# Patient Record
Sex: FEMALE | Race: WHITE | NOT HISPANIC OR LATINO | URBAN - METROPOLITAN AREA
[De-identification: names, ages, dates, MRNs, and addresses within clinical notes are randomized per-mention and may not be internally consistent; named-entity substitution may affect disease eponyms.]

---

## 2017-08-25 ENCOUNTER — EMERGENCY (EMERGENCY)
Facility: HOSPITAL | Age: 72
LOS: 1 days | End: 2017-08-25
Payer: MEDICARE

## 2017-08-25 ENCOUNTER — OUTPATIENT (OUTPATIENT)
Dept: OUTPATIENT SERVICES | Facility: HOSPITAL | Age: 72
LOS: 1 days | End: 2017-08-25

## 2017-08-25 PROCEDURE — 71010: CPT | Mod: 26

## 2017-08-25 PROCEDURE — 99285 EMERGENCY DEPT VISIT HI MDM: CPT

## 2017-08-26 ENCOUNTER — OUTPATIENT (OUTPATIENT)
Dept: OUTPATIENT SERVICES | Facility: HOSPITAL | Age: 72
LOS: 1 days | End: 2017-08-26

## 2022-09-13 ENCOUNTER — APPOINTMENT (OUTPATIENT)
Dept: OPHTHALMOLOGY | Facility: CLINIC | Age: 77
End: 2022-09-13

## 2022-09-13 ENCOUNTER — NON-APPOINTMENT (OUTPATIENT)
Age: 77
End: 2022-09-13

## 2022-09-13 PROCEDURE — 92014 COMPRE OPH EXAM EST PT 1/>: CPT

## 2022-09-13 PROCEDURE — 92134 CPTRZ OPH DX IMG PST SGM RTA: CPT

## 2022-09-27 ENCOUNTER — NON-APPOINTMENT (OUTPATIENT)
Age: 77
End: 2022-09-27

## 2022-09-27 ENCOUNTER — APPOINTMENT (OUTPATIENT)
Dept: OPHTHALMOLOGY | Facility: CLINIC | Age: 77
End: 2022-09-27

## 2022-09-27 PROCEDURE — 92134 CPTRZ OPH DX IMG PST SGM RTA: CPT | Mod: LT

## 2022-09-27 PROCEDURE — 92014 COMPRE OPH EXAM EST PT 1/>: CPT

## 2022-10-07 ENCOUNTER — APPOINTMENT (OUTPATIENT)
Dept: OPHTHALMOLOGY | Facility: CLINIC | Age: 77
End: 2022-10-07

## 2022-10-07 ENCOUNTER — NON-APPOINTMENT (OUTPATIENT)
Age: 77
End: 2022-10-07

## 2022-10-07 PROCEDURE — 67028 INJECTION EYE DRUG: CPT | Mod: LT

## 2022-10-14 ENCOUNTER — NON-APPOINTMENT (OUTPATIENT)
Age: 77
End: 2022-10-14

## 2022-10-14 ENCOUNTER — APPOINTMENT (OUTPATIENT)
Dept: OPHTHALMOLOGY | Facility: CLINIC | Age: 77
End: 2022-10-14

## 2022-10-14 PROCEDURE — 92012 INTRM OPH EXAM EST PATIENT: CPT

## 2022-11-04 ENCOUNTER — NON-APPOINTMENT (OUTPATIENT)
Age: 77
End: 2022-11-04

## 2022-11-04 ENCOUNTER — APPOINTMENT (OUTPATIENT)
Dept: OPHTHALMOLOGY | Facility: CLINIC | Age: 77
End: 2022-11-04

## 2022-11-04 PROCEDURE — 92012 INTRM OPH EXAM EST PATIENT: CPT

## 2022-11-04 PROCEDURE — 92134 CPTRZ OPH DX IMG PST SGM RTA: CPT

## 2022-12-02 ENCOUNTER — APPOINTMENT (OUTPATIENT)
Dept: OPHTHALMOLOGY | Facility: CLINIC | Age: 77
End: 2022-12-02
Payer: MEDICARE

## 2022-12-02 ENCOUNTER — NON-APPOINTMENT (OUTPATIENT)
Age: 77
End: 2022-12-02

## 2022-12-02 PROCEDURE — 92134 CPTRZ OPH DX IMG PST SGM RTA: CPT | Mod: LT

## 2022-12-02 PROCEDURE — 67028 INJECTION EYE DRUG: CPT | Mod: LT

## 2023-01-06 ENCOUNTER — NON-APPOINTMENT (OUTPATIENT)
Age: 78
End: 2023-01-06

## 2023-01-06 ENCOUNTER — APPOINTMENT (OUTPATIENT)
Dept: OPHTHALMOLOGY | Facility: CLINIC | Age: 78
End: 2023-01-06
Payer: MEDICARE

## 2023-01-06 PROCEDURE — 92012 INTRM OPH EXAM EST PATIENT: CPT

## 2023-01-06 PROCEDURE — 92134 CPTRZ OPH DX IMG PST SGM RTA: CPT

## 2023-01-17 ENCOUNTER — APPOINTMENT (OUTPATIENT)
Dept: OPHTHALMOLOGY | Facility: CLINIC | Age: 78
End: 2023-01-17

## 2023-02-03 ENCOUNTER — NON-APPOINTMENT (OUTPATIENT)
Age: 78
End: 2023-02-03

## 2023-02-03 ENCOUNTER — APPOINTMENT (OUTPATIENT)
Dept: OPHTHALMOLOGY | Facility: CLINIC | Age: 78
End: 2023-02-03
Payer: MEDICARE

## 2023-02-03 PROCEDURE — 67028 INJECTION EYE DRUG: CPT | Mod: LT

## 2023-02-03 PROCEDURE — 92134 CPTRZ OPH DX IMG PST SGM RTA: CPT

## 2023-02-14 PROBLEM — Z00.00 ENCOUNTER FOR PREVENTIVE HEALTH EXAMINATION: Status: ACTIVE | Noted: 2023-02-14

## 2023-03-03 ENCOUNTER — NON-APPOINTMENT (OUTPATIENT)
Age: 78
End: 2023-03-03

## 2023-03-03 ENCOUNTER — APPOINTMENT (OUTPATIENT)
Dept: OPHTHALMOLOGY | Facility: CLINIC | Age: 78
End: 2023-03-03
Payer: MEDICARE

## 2023-03-03 PROCEDURE — 92134 CPTRZ OPH DX IMG PST SGM RTA: CPT

## 2023-03-03 PROCEDURE — 67028 INJECTION EYE DRUG: CPT | Mod: LT

## 2023-04-14 ENCOUNTER — NON-APPOINTMENT (OUTPATIENT)
Age: 78
End: 2023-04-14

## 2023-04-14 ENCOUNTER — APPOINTMENT (OUTPATIENT)
Dept: OPHTHALMOLOGY | Facility: CLINIC | Age: 78
End: 2023-04-14
Payer: MEDICARE

## 2023-04-14 PROCEDURE — 92012 INTRM OPH EXAM EST PATIENT: CPT

## 2023-04-14 PROCEDURE — 92134 CPTRZ OPH DX IMG PST SGM RTA: CPT

## 2023-05-26 ENCOUNTER — APPOINTMENT (OUTPATIENT)
Dept: OPHTHALMOLOGY | Facility: CLINIC | Age: 78
End: 2023-05-26
Payer: MEDICARE

## 2023-05-26 ENCOUNTER — NON-APPOINTMENT (OUTPATIENT)
Age: 78
End: 2023-05-26

## 2023-05-26 PROCEDURE — 92134 CPTRZ OPH DX IMG PST SGM RTA: CPT

## 2023-05-26 PROCEDURE — 67028 INJECTION EYE DRUG: CPT | Mod: LT

## 2023-06-20 ENCOUNTER — NON-APPOINTMENT (OUTPATIENT)
Age: 78
End: 2023-06-20

## 2023-06-20 ENCOUNTER — APPOINTMENT (OUTPATIENT)
Dept: OPHTHALMOLOGY | Facility: CLINIC | Age: 78
End: 2023-06-20
Payer: MEDICARE

## 2023-06-20 PROCEDURE — 92014 COMPRE OPH EXAM EST PT 1/>: CPT

## 2023-06-23 ENCOUNTER — NON-APPOINTMENT (OUTPATIENT)
Age: 78
End: 2023-06-23

## 2023-06-23 ENCOUNTER — APPOINTMENT (OUTPATIENT)
Dept: OPHTHALMOLOGY | Facility: CLINIC | Age: 78
End: 2023-06-23
Payer: MEDICARE

## 2023-06-23 PROCEDURE — 92012 INTRM OPH EXAM EST PATIENT: CPT

## 2023-06-23 PROCEDURE — 92134 CPTRZ OPH DX IMG PST SGM RTA: CPT | Mod: LT

## 2023-07-21 ENCOUNTER — NON-APPOINTMENT (OUTPATIENT)
Age: 78
End: 2023-07-21

## 2023-07-21 ENCOUNTER — APPOINTMENT (OUTPATIENT)
Dept: OPHTHALMOLOGY | Facility: CLINIC | Age: 78
End: 2023-07-21
Payer: MEDICARE

## 2023-07-21 PROCEDURE — 92134 CPTRZ OPH DX IMG PST SGM RTA: CPT

## 2023-07-21 PROCEDURE — 67028 INJECTION EYE DRUG: CPT | Mod: LT

## 2023-08-22 ENCOUNTER — APPOINTMENT (OUTPATIENT)
Dept: OPHTHALMOLOGY | Facility: CLINIC | Age: 78
End: 2023-08-22
Payer: MEDICARE

## 2023-08-22 ENCOUNTER — NON-APPOINTMENT (OUTPATIENT)
Age: 78
End: 2023-08-22

## 2023-08-22 PROCEDURE — 92012 INTRM OPH EXAM EST PATIENT: CPT

## 2023-08-22 PROCEDURE — 92134 CPTRZ OPH DX IMG PST SGM RTA: CPT

## 2023-09-26 ENCOUNTER — NON-APPOINTMENT (OUTPATIENT)
Age: 78
End: 2023-09-26

## 2023-09-26 ENCOUNTER — APPOINTMENT (OUTPATIENT)
Dept: OPHTHALMOLOGY | Facility: CLINIC | Age: 78
End: 2023-09-26
Payer: MEDICARE

## 2023-09-26 PROCEDURE — 92134 CPTRZ OPH DX IMG PST SGM RTA: CPT

## 2023-09-26 PROCEDURE — 67028 INJECTION EYE DRUG: CPT | Mod: LT

## 2023-11-07 ENCOUNTER — APPOINTMENT (OUTPATIENT)
Dept: OPHTHALMOLOGY | Facility: CLINIC | Age: 78
End: 2023-11-07
Payer: MEDICARE

## 2023-11-07 ENCOUNTER — NON-APPOINTMENT (OUTPATIENT)
Age: 78
End: 2023-11-07

## 2023-11-07 PROCEDURE — 92134 CPTRZ OPH DX IMG PST SGM RTA: CPT

## 2023-11-07 PROCEDURE — 92014 COMPRE OPH EXAM EST PT 1/>: CPT

## 2023-12-12 ENCOUNTER — APPOINTMENT (OUTPATIENT)
Dept: OPHTHALMOLOGY | Facility: CLINIC | Age: 78
End: 2023-12-12

## 2023-12-19 ENCOUNTER — NON-APPOINTMENT (OUTPATIENT)
Age: 78
End: 2023-12-19

## 2023-12-19 ENCOUNTER — APPOINTMENT (OUTPATIENT)
Dept: OPHTHALMOLOGY | Facility: CLINIC | Age: 78
End: 2023-12-19
Payer: MEDICARE

## 2023-12-19 PROCEDURE — 67028 INJECTION EYE DRUG: CPT | Mod: LT

## 2023-12-19 PROCEDURE — 92134 CPTRZ OPH DX IMG PST SGM RTA: CPT

## 2024-01-09 ENCOUNTER — APPOINTMENT (OUTPATIENT)
Dept: OPHTHALMOLOGY | Facility: CLINIC | Age: 79
End: 2024-01-09
Payer: MEDICARE

## 2024-01-09 ENCOUNTER — NON-APPOINTMENT (OUTPATIENT)
Age: 79
End: 2024-01-09

## 2024-01-09 PROCEDURE — 67210 TREATMENT OF RETINAL LESION: CPT | Mod: LT

## 2024-01-16 ENCOUNTER — APPOINTMENT (OUTPATIENT)
Dept: OPHTHALMOLOGY | Facility: CLINIC | Age: 79
End: 2024-01-16
Payer: MEDICARE

## 2024-01-16 ENCOUNTER — NON-APPOINTMENT (OUTPATIENT)
Age: 79
End: 2024-01-16

## 2024-01-16 PROCEDURE — 99024 POSTOP FOLLOW-UP VISIT: CPT

## 2024-01-16 PROCEDURE — 92134 CPTRZ OPH DX IMG PST SGM RTA: CPT

## 2024-01-30 ENCOUNTER — APPOINTMENT (OUTPATIENT)
Dept: OPHTHALMOLOGY | Facility: CLINIC | Age: 79
End: 2024-01-30
Payer: MEDICARE

## 2024-01-30 ENCOUNTER — NON-APPOINTMENT (OUTPATIENT)
Age: 79
End: 2024-01-30

## 2024-01-30 PROCEDURE — 92133 CPTRZD OPH DX IMG PST SGM ON: CPT

## 2024-01-30 PROCEDURE — 76514 ECHO EXAM OF EYE THICKNESS: CPT

## 2024-01-30 PROCEDURE — 92014 COMPRE OPH EXAM EST PT 1/>: CPT | Mod: 24

## 2024-01-30 PROCEDURE — 92083 EXTENDED VISUAL FIELD XM: CPT

## 2024-02-16 ENCOUNTER — APPOINTMENT (OUTPATIENT)
Dept: OPHTHALMOLOGY | Facility: CLINIC | Age: 79
End: 2024-02-16
Payer: MEDICARE

## 2024-02-16 ENCOUNTER — NON-APPOINTMENT (OUTPATIENT)
Age: 79
End: 2024-02-16

## 2024-02-16 PROCEDURE — 92134 CPTRZ OPH DX IMG PST SGM RTA: CPT

## 2024-02-16 PROCEDURE — 67028 INJECTION EYE DRUG: CPT | Mod: 79,LT

## 2024-03-25 ENCOUNTER — APPOINTMENT (OUTPATIENT)
Dept: OBGYN | Facility: CLINIC | Age: 79
End: 2024-03-25
Payer: MEDICARE

## 2024-03-25 VITALS
HEIGHT: 60 IN | SYSTOLIC BLOOD PRESSURE: 166 MMHG | WEIGHT: 190 LBS | DIASTOLIC BLOOD PRESSURE: 84 MMHG | BODY MASS INDEX: 37.3 KG/M2

## 2024-03-25 DIAGNOSIS — Z82.49 FAMILY HISTORY OF ISCHEMIC HEART DISEASE AND OTHER DISEASES OF THE CIRCULATORY SYSTEM: ICD-10-CM

## 2024-03-25 DIAGNOSIS — Z86.718 PERSONAL HISTORY OF OTHER VENOUS THROMBOSIS AND EMBOLISM: ICD-10-CM

## 2024-03-25 DIAGNOSIS — I10 ESSENTIAL (PRIMARY) HYPERTENSION: ICD-10-CM

## 2024-03-25 DIAGNOSIS — Z78.9 OTHER SPECIFIED HEALTH STATUS: ICD-10-CM

## 2024-03-25 DIAGNOSIS — E78.5 HYPERLIPIDEMIA, UNSPECIFIED: ICD-10-CM

## 2024-03-25 PROCEDURE — 99213 OFFICE O/P EST LOW 20 MIN: CPT

## 2024-03-25 RX ORDER — MELATONIN 3 MG
TABLET ORAL
Refills: 0 | Status: ACTIVE | COMMUNITY

## 2024-03-25 RX ORDER — CLONAZEPAM 2 MG/1
TABLET ORAL
Refills: 0 | Status: ACTIVE | COMMUNITY

## 2024-03-25 NOTE — HISTORY OF PRESENT ILLNESS
[FreeTextEntry1] : 79 yo  presents with history of "pressure" and "feeling like something is coming out" since January.  Denies incontinence or any difficulties with urination.  Hx of right inguinal hernia, Htn, hyperlipidemia

## 2024-03-25 NOTE — DISCUSSION/SUMMARY
[FreeTextEntry1] : I described cystocele to patient and reviewed etiology and management options.  I have referred her to Dr. Cooper for further evaluatio and management.  Patient verbalizes understanding of and agreement with this plan.  All questions answered to patient's satisfaction.

## 2024-03-25 NOTE — REVIEW OF SYSTEMS
[Patient Intake Form Reviewed] : Patient intake form was reviewed [FreeTextEntry8] : pressure in vagina

## 2024-03-25 NOTE — PHYSICAL EXAM
[Alert] : alert [Appropriately responsive] : appropriately responsive [Regular Rate Rhythm] : regular rate rhythm [Oriented x3] : oriented x3 [Clear to Auscultation B/L] : clear to auscultation bilaterally [Labia Majora] : normal [Labia Minora] : normal [Normal] : normal [Cystocele] : a cystocele [FreeTextEntry4] : Grade 2 cystocele

## 2024-03-29 ENCOUNTER — NON-APPOINTMENT (OUTPATIENT)
Age: 79
End: 2024-03-29

## 2024-03-29 ENCOUNTER — APPOINTMENT (OUTPATIENT)
Dept: OPHTHALMOLOGY | Facility: CLINIC | Age: 79
End: 2024-03-29
Payer: MEDICARE

## 2024-03-29 PROCEDURE — 92134 CPTRZ OPH DX IMG PST SGM RTA: CPT

## 2024-03-29 PROCEDURE — 99024 POSTOP FOLLOW-UP VISIT: CPT

## 2024-04-12 ENCOUNTER — APPOINTMENT (OUTPATIENT)
Dept: UROGYNECOLOGY | Facility: CLINIC | Age: 79
End: 2024-04-12
Payer: MEDICARE

## 2024-04-12 DIAGNOSIS — R35.0 FREQUENCY OF MICTURITION: ICD-10-CM

## 2024-04-12 DIAGNOSIS — R14.0 ABDOMINAL DISTENSION (GASEOUS): ICD-10-CM

## 2024-04-12 PROCEDURE — 99204 OFFICE O/P NEW MOD 45 MIN: CPT | Mod: 25

## 2024-04-12 PROCEDURE — 51701 INSERT BLADDER CATHETER: CPT | Mod: 59

## 2024-04-12 PROCEDURE — 81003 URINALYSIS AUTO W/O SCOPE: CPT | Mod: QW

## 2024-04-12 PROCEDURE — 99459 PELVIC EXAMINATION: CPT

## 2024-04-12 NOTE — PROCEDURE
[Straight Catheterization] : insertion of a straight catheter [Patient] : the patient [___ Fr Straight Tip] : a [unfilled] in [place] straight tip catheter [None] : there were no complications with the catheter insertion [Clear] : clear [No Complications] : no complications [Tolerated Well] : the patient tolerated the procedure well [Post procedure instructions and information given] : Post procedure instructions and information were given and reviewed with patient. [0] : 0 [Urinary Frequency] : urinary frequency [Culture] : culture [Urinalysis] : urinalysis

## 2024-04-12 NOTE — ASSESSMENT
[FreeTextEntry1] : Patient is a 78 year old multipara with new onset of pelvic organ prolapse mainly involving the anterior vaginal wall for past 2 to 3 months.  Patient is bothered by sensation of vaginal pressure after prolonged standing however she denies difficulty voiding or defecating.  On exam, she has a small cystocele (stage II) with a well supported cervix however exam was limited by poor Valsalva

## 2024-04-12 NOTE — LETTER BODY
[Dear  ___] : Dear ~GARCIA, [I had the pleasure of evaluating your patient, [unfilled]. Thank you for referring Ms. [unfilled] for consultation for ___] : I had the pleasure of evaluating your patient, [unfilled]. Thank you for referring Ms. [unfilled] for consultation for [unfilled]. [Attached please find my note.] : Attached please find my note. [Thank you very much for allowing me to participate in the care of this patient. If you have any questions, please do not hesitate to contact me] : Thank you very much for allowing me to participate in the care of this patient. If you have any questions, please do not hesitate to contact me.

## 2024-04-12 NOTE — DISCUSSION/SUMMARY
[FreeTextEntry1] : Patient and her niece were counseled regarding evaluation and management of pelvic organ prolapse. Following management plan was outlined after detailed discussion with patient. 1.  We discussed expectant management, pessary versus surgical management for anterior vaginal wall prolapse.  She is bothered by the prolapse hence she does not want to pursue expectant management.  She feels that she would not be able to tolerate pessary maintenance exams.  She is interested in definite surgical management.  Discussed option of isolated native tissue anterior colporrhaphy versus anterior colporrhaphy with sacrospinous hysteropexy versus vaginal hysterectomy, BSO, anterior colporrhaphy.  Recommended pelvic ultrasound to exclude adnexal mass or thickened endometrium.  Patient will review all the above options.  She will return after pelvic ultrasound for further discussion 2.  Recommended urodynamic testing to screen for occult stress urinary incontinence and for further evaluation of nocturia 3.  Recommended CT abdomen due to mild the presence of mild abdominal distention.  Patient never had colonoscopy. 4.  Urine culture was sent to exclude urinary tract infection.  If there is evidence of urinary tract infection, will recommend antibiotic such as Keflex 500 mg p.o. twice daily for 7 days 5. F/u after urodynamic testing

## 2024-04-12 NOTE — PHYSICAL EXAM
[Chaperone Present] : A chaperone was present in the examining room during all aspects of the physical examination [82348] : A chaperone was present during the pelvic exam. [FreeTextEntry2] : Lolly [FreeTextEntry1] : General: Not in acute distress, alert and oriented x3.   Neck: Supple. No lymphadenopathy.  Abdomen: Soft, nontender, appears to be mildly distended without any guarding. No obvious hepatosplenomegaly. No obvious hernias. A midline upper abdominal vertical skin incision of previous cholecystectomy with a scar suggestive of previous drain placement.  Pelvic Exam: Normal external female genitalia. Saddle sensory exam S2 to S4 is intact. Perineal reflexes not visualized. Urethra is hypermobile without prolapse, exudates, or lesions. Cough stress test is negative with and without anterior vaginal wall reduction. Post void residual was checked with I/O cath and was 50 cc of clear urine. Pale and atrophic-appearing vaginal epithelium. No vaginal blood or discharge. Cervix not visualized but palpably normal.  Anterior vaginal wall prolapse to 1 cm outside the hymen, however the exam was limited by her habitus.  Bimanual exam reveals a small uterus in normal positioning. No palpable adnexal masses or tenderness.    POPQ: Aa +1 , Ba +1, C -5, TVL 9, D-5, GH 3, PB 3, Ap -2.5, Bp -2.5 (poor Valsalva, patient unable to relax )

## 2024-04-12 NOTE — HISTORY OF PRESENT ILLNESS
Avelino Morillo is a 70 y.o. female.     History of Present Illness     The following portions of the patient's history were reviewed and updated as appropriate: allergies, current medications, past family history, past medical history, past social history, past surgical history and problem list.    1 HTN- chronic and controlled.  Patient denies any shortness of breath, chest pain, nausea, vomiting, headache, fatigue, any other systemic symptoms.    2 Cataracts- chronic.  Patient recently had cataract surgery and is improving with vision and doing well.    3 Left Wrist pain-acute.  Over the past 2 to 3 weeks patient has been having increase pain in the left wrist.  Associated with flexion, extension, and occasionally with typing.  Patient denies any recent trauma to the left wrist but it does interfere with her ability to do dexterity and typing.    4 right shoulder pain-right shoulder pain has been chronic and continues to have some mild discomfort with the shoulder with rotational movement and reach.        Review of Systems   All other systems reviewed and are negative.      Objective   Physical Exam  Vitals signs and nursing note reviewed.   Constitutional:       Appearance: Normal appearance. She is normal weight.   HENT:      Head: Normocephalic and atraumatic.      Mouth/Throat:      Mouth: Mucous membranes are moist.      Pharynx: Oropharynx is clear.   Eyes:      Extraocular Movements: Extraocular movements intact.      Pupils: Pupils are equal, round, and reactive to light.   Neck:      Musculoskeletal: Normal range of motion and neck supple.   Cardiovascular:      Rate and Rhythm: Normal rate and regular rhythm.      Pulses: Normal pulses.      Heart sounds: Normal heart sounds.   Pulmonary:      Effort: Pulmonary effort is normal.      Breath sounds: Normal breath sounds.   Abdominal:      General: Bowel sounds are normal.      Palpations: Abdomen is soft.   Musculoskeletal: Normal range  of motion.         General: Tenderness present.      Comments: Tenderness to palpation in the left wrist.  Tenderness and soreness to palpation in the right shoulder.   Neurological:      Mental Status: She is alert.           Assessment/Plan   Diagnoses and all orders for this visit:    1. Chronic pain of right knee (Primary)-patient will follow up with orthopedics and is most likely due for right knee replacement.    2. Cataract of both eyes, unspecified cataract type-chronic continue with ophthalmology recommendations.    3. Left wrist pain  -     meloxicam (Mobic) 15 MG tablet; Take 1 tablet by mouth Daily.  Dispense: 30 tablet; Refill: 3    4. Essential hypertension  -     losartan (COZAAR) 100 MG tablet; Take 1 tablet by mouth Daily.  Dispense: 90 tablet; Refill: 1                [FreeTextEntry1] : Patient is a 78 year para 3 ( x 3) who is referred by Leonela Thompson for evaluation and management of possible prolapse. Patient is from [place].  She speaks and understands English however she prefers her niece to accompany her and translates for her if needed   Pt reports she feels vaginal bulge and pressure in her vagina for last two months. Reports after prolong standing and walking she can touch something is coming out.  Per niece, patient reports discomfort in the vaginal area on prolonged standing.  Denies vaginal discharge and bleeding. Denies EDVIN and other urinary symptoms like frequency, urgency, hematuria, nephrolithiasis, vaginal dryness, diarrhea and constipation. Pt is not sexually active and doesn't smoke.    GYN: LMP ~, Last pap haven't had for years. PMH: HTN, Cardiac stent,  ? blood clots PSH: Inguinal hernia repair , cholecystectomy Meds: Simvastatin, Amlodipine, lisinopril, carvedilol, Albuterol, Aspirin 81mg, Allx: NKDA

## 2024-04-15 LAB
APPEARANCE: CLEAR
BACTERIA UR CULT: NORMAL
BACTERIA: NEGATIVE /HPF
BILIRUBIN URINE: NEGATIVE
BLOOD URINE: NEGATIVE
CAST: 0 /LPF
COLOR: YELLOW
EPITHELIAL CELLS: 0 /HPF
GLUCOSE QUALITATIVE U: NEGATIVE MG/DL
KETONES URINE: ABNORMAL MG/DL
LEUKOCYTE ESTERASE URINE: ABNORMAL
MICROSCOPIC-UA: NORMAL
NITRITE URINE: NEGATIVE
PH URINE: 5.5
PROTEIN URINE: NEGATIVE MG/DL
RED BLOOD CELLS URINE: 0 /HPF
SPECIFIC GRAVITY URINE: 1.01
UROBILINOGEN URINE: 0.2 MG/DL
WHITE BLOOD CELLS URINE: 0 /HPF

## 2024-04-24 ENCOUNTER — APPOINTMENT (OUTPATIENT)
Dept: ULTRASOUND IMAGING | Facility: CLINIC | Age: 79
End: 2024-04-24
Payer: MEDICARE

## 2024-04-24 ENCOUNTER — APPOINTMENT (OUTPATIENT)
Dept: CT IMAGING | Facility: CLINIC | Age: 79
End: 2024-04-24

## 2024-04-24 PROCEDURE — 76830 TRANSVAGINAL US NON-OB: CPT

## 2024-04-24 PROCEDURE — 74178 CT ABD&PLV WO CNTR FLWD CNTR: CPT

## 2024-04-29 ENCOUNTER — NON-APPOINTMENT (OUTPATIENT)
Age: 79
End: 2024-04-29

## 2024-05-03 ENCOUNTER — NON-APPOINTMENT (OUTPATIENT)
Age: 79
End: 2024-05-03

## 2024-05-03 ENCOUNTER — APPOINTMENT (OUTPATIENT)
Dept: OPHTHALMOLOGY | Facility: CLINIC | Age: 79
End: 2024-05-03
Payer: MEDICARE

## 2024-05-03 PROCEDURE — 99213 OFFICE O/P EST LOW 20 MIN: CPT

## 2024-05-03 PROCEDURE — 92134 CPTRZ OPH DX IMG PST SGM RTA: CPT

## 2024-05-16 ENCOUNTER — RESULT CHARGE (OUTPATIENT)
Age: 79
End: 2024-05-16

## 2024-05-17 ENCOUNTER — APPOINTMENT (OUTPATIENT)
Dept: UROGYNECOLOGY | Facility: CLINIC | Age: 79
End: 2024-05-17
Payer: MEDICARE

## 2024-05-17 DIAGNOSIS — N81.2 INCOMPLETE UTEROVAGINAL PROLAPSE: ICD-10-CM

## 2024-05-17 PROCEDURE — 51784 ANAL/URINARY MUSCLE STUDY: CPT

## 2024-05-17 PROCEDURE — 51797 INTRAABDOMINAL PRESSURE TEST: CPT

## 2024-05-17 PROCEDURE — 51741 ELECTRO-UROFLOWMETRY FIRST: CPT

## 2024-05-17 PROCEDURE — 51729 CYSTOMETROGRAM W/VP&UP: CPT

## 2024-05-20 LAB
APPEARANCE: CLEAR
BACTERIA UR CULT: NORMAL
BACTERIA: NEGATIVE /HPF
BILIRUBIN URINE: NEGATIVE
BLOOD URINE: NEGATIVE
CAST: 0 /LPF
COLOR: YELLOW
EPITHELIAL CELLS: 0 /HPF
GLUCOSE QUALITATIVE U: NEGATIVE MG/DL
KETONES URINE: NEGATIVE MG/DL
LEUKOCYTE ESTERASE URINE: NEGATIVE
MICROSCOPIC-UA: NORMAL
NITRITE URINE: NEGATIVE
PH URINE: 6
PROTEIN URINE: NEGATIVE MG/DL
RED BLOOD CELLS URINE: 0 /HPF
REVIEW: NORMAL
SPECIFIC GRAVITY URINE: 1.02
UROBILINOGEN URINE: 0.2 MG/DL
WHITE BLOOD CELLS URINE: 0 /HPF

## 2024-05-23 ENCOUNTER — APPOINTMENT (OUTPATIENT)
Dept: UROGYNECOLOGY | Facility: CLINIC | Age: 79
End: 2024-05-23
Payer: MEDICARE

## 2024-05-23 PROCEDURE — 99214 OFFICE O/P EST MOD 30 MIN: CPT

## 2024-05-23 NOTE — DISCUSSION/SUMMARY
[FreeTextEntry1] : Patient is Polish speaking.  Entire visit was conducted with the help of language line solutions Polish  Zully ID #  [id number]  Patient was counseled regarding the findings of urodynamic testing.  I did patient denies symptoms of urine incontinence.  She states that her underwear is always dry.  I counseled her regarding the etiology of occult stress urinary incontinence.  I discussed pros and cons of concomitant retropubic sling procedure versus a staged approach.  I counseled her regarding additional risk associated with sling placement including risk of bladder catheterization, voiding dysfunction, frequent urinary tract infections, urgency incontinence etc. after detailed discussion, she would like to proceed with the sling placement at the time of prolapse repair.  I again reviewed isolated native tissue anterior colporrhaphy versus sacrospinous hysteropexy with anterior repair versus robot-assisted supracervical hysterectomy and sacrocolpopexy.  After detailed discussion, patient would like to undergo a vaginal procedure.  On exam, she has a well supported cervix and I anticipate her requiring anterior repair only, however, I will examine her under anesthesia and if needed, will perform sacrospinous hysteropexy.  Patient is comfortable with this plan.  She was counseled regarding the risk of the procedure including injury to surrounding organs, urinary tract infections etc. postoperative recovery time discussed, and restrictions reviewed.  I explained to her that we may not be able to perform the surgery in June however I have submitted the OR booking sheet.  Advised her not to travel overseas for at least 4 weeks following surgery to avoid complications such as DVT etc.  She verbalized understand Patient's niece Stefanie who accompanied her on last visit he was not able to make it today.  Stefanie can be reached at [phone] to help patient coordinate the surgical booking etc.

## 2024-05-23 NOTE — ASSESSMENT
[FreeTextEntry1] : Patient's urodynamic test findings are consistent with increase sensation, normal cystometric capacity, normal compliance, absence of detrusor overactivity during filling, presence of a stress urinary incontinence and absence of voiding dysfunction however there appears to be hypoactivity of the bladder muscles. She appears to be a reasonable candidate for mid urethral sling placement however she is at high risk of requiring bladder catheterization in the postop period.  Pelvic exam was repeated again today and confirmed presence of isolated anterior vaginal wall prolapse

## 2024-05-23 NOTE — HISTORY OF PRESENT ILLNESS
[FreeTextEntry1] : Patient is a 78 year old multipara with new onset of pelvic organ prolapse mainly involving the anterior vaginal wall for past 3-4 months. Patient is bothered by sensation of vaginal pressure after prolonged standing however she denies difficulty voiding or defecating. On exam, she has a small cystocele (stage II) with a well supported cervix however exam was limited by poor Valsalva.. Patient is considering surgical management.. She presented for urodynamic testing on 5/17/2024. Her urodynamic test findings include an inconclusive uroflow due to small voided volume of 10 cc with postvoid residual of 40 cc; her complex cystogram showed increased sensation, normal compliance, reduced cystometric capacity of 200 cc however she voided 366 cc for the pressure voiding study indicating cystometric capacity of at least 366 cc,, presence of a stress urinary incontinence with cough starting at filled volume of 100 cc and with Valsalva starting at fill volume of 200 cc, and absence of detrusor overactivity during filling pelvic: She voided 366 cc for pressure voiding study in the presence of pressure transducers with max flow rate of 10, flow time 7., Detrusor pressure at peak flow of 4 cm of water, somewhat prolonged uroflow and detrusor contraction as the mechanism of void.  I reviewed patient's CAT scan from 5/9/2024. She has small renall cysts but no hydronephrosis or renal mass. Colonic diverticulosis without diverticulitis   I also reviewed her transvaginal pelvic ultrasound that showed endometrial thickness of 5 mm, small uterine fibroids, absence of endometrial fluid and absence of adnexal mass.  Patient is planning to travel to [place] in end of June and would like to schedule her surgery as soon as possible

## 2024-05-23 NOTE — PHYSICAL EXAM
[No Acute Distress] : in no acute distress [Well developed] : well developed [Oriented x3] : oriented to person, place, and time [FreeTextEntry1] : General: Not in acute distress, alert and oriented x3. Pelvic Exam: Normal external female genitalia.  Anterior vaginal wall again noted to be protruding at least a centimeter outside the hymen.  Cervix is well supported, at least 5 to 6 cm above the hymen

## 2024-05-24 LAB
APPEARANCE: CLEAR
BACTERIA: NEGATIVE /HPF
BILIRUBIN URINE: NEGATIVE
BLOOD URINE: NEGATIVE
CAST: 0 /LPF
COLOR: YELLOW
EPITHELIAL CELLS: 0 /HPF
GLUCOSE QUALITATIVE U: NEGATIVE MG/DL
KETONES URINE: NEGATIVE MG/DL
LEUKOCYTE ESTERASE URINE: ABNORMAL
MICROSCOPIC-UA: NORMAL
NITRITE URINE: NEGATIVE
PH URINE: 6
PROTEIN URINE: NEGATIVE MG/DL
RED BLOOD CELLS URINE: 0 /HPF
SPECIFIC GRAVITY URINE: 1.01
UROBILINOGEN URINE: 0.2 MG/DL
WHITE BLOOD CELLS URINE: 1 /HPF

## 2024-05-28 LAB — BACTERIA UR CULT: NORMAL

## 2024-06-04 ENCOUNTER — APPOINTMENT (OUTPATIENT)
Dept: OPHTHALMOLOGY | Facility: CLINIC | Age: 79
End: 2024-06-04
Payer: MEDICARE

## 2024-06-04 ENCOUNTER — NON-APPOINTMENT (OUTPATIENT)
Age: 79
End: 2024-06-04

## 2024-06-04 PROCEDURE — 92134 CPTRZ OPH DX IMG PST SGM RTA: CPT

## 2024-06-04 PROCEDURE — 67028 INJECTION EYE DRUG: CPT | Mod: LT

## 2024-06-14 ENCOUNTER — RESULT REVIEW (OUTPATIENT)
Age: 79
End: 2024-06-14

## 2024-06-14 ENCOUNTER — APPOINTMENT (OUTPATIENT)
Dept: UROGYNECOLOGY | Facility: CLINIC | Age: 79
End: 2024-06-14
Payer: MEDICARE

## 2024-06-14 PROCEDURE — 58120 DILATION AND CURETTAGE: CPT

## 2024-06-14 PROCEDURE — 57240 ANTERIOR COLPORRHAPHY: CPT

## 2024-06-14 PROCEDURE — 57288 REPAIR BLADDER DEFECT: CPT

## 2024-06-14 PROCEDURE — 57282 COLPOPEXY EXTRAPERITONEAL: CPT

## 2024-06-17 ENCOUNTER — APPOINTMENT (OUTPATIENT)
Dept: UROGYNECOLOGY | Facility: CLINIC | Age: 79
End: 2024-06-17
Payer: MEDICARE

## 2024-06-17 PROCEDURE — 99024 POSTOP FOLLOW-UP VISIT: CPT

## 2024-06-17 NOTE — OBJECTIVE
[Post Void Residual ____ ml] : Post Void Residual was [unfilled] ml [Soft and Nontender] : soft and nontender [Clean, Dry, Intact] : Clean, Dry, Intact [Good Support] : Good support [FreeTextEntry2] : Suprapubic trocar sites are intact and healing well. [FreeTextEntry3] : No abnormal discharge noted on the perineum.  Patient declined speculum exam

## 2024-06-17 NOTE — DISCUSSION/SUMMARY
[FreeTextEntry1] : Discussed bowel regimen with patient and her son using Polish language line  Shae ID number [id number] Advised patient to start taking 1 heaping this point of MiraLAX once or twice a day as well as continue taking Colace 100 mg p.o. twice daily Advised her to discontinue use of oxycodone and take ibuprofen and Tylenol on a scheduled.  Follow-up in 2 weeks

## 2024-06-17 NOTE — ASSESSMENT
[FreeTextEntry1] : Patient is a 78-year-old who is making excellent postoperative recovery.  She passed a voiding trial and Mason was discontinued.

## 2024-06-17 NOTE — SUBJECTIVE
[FreeTextEntry1] : Patient is a 78-year-old who underwent sacrospinous hysteropexy, anterior repair, and mid urethral sling placement on 6/14/2024.  She had an inconclusive voiding trial in the hospital and was discharged home with the Mason catheter on postop day #1.  She presents today for a repeat voiding trial.  She states that she has no pain however she would like her catheter to be removed.  She reports constipation and has not had a bowel movement since the surgery.  She is passing flatus.  On review of  her bowel regimen, she is only taking Colace twice a day.  She is not taking MiraLAX or Metamucil.  She denies vaginal bleeding.  She is not requiring pad use.  Her son Hang is accompanying her.  He states that patient is very uncomfortable due to presence of Mason but otherwise doing fairly well

## 2024-06-17 NOTE — DISCUSSION/SUMMARY
[FreeTextEntry1] : Discussed bowel regimen with patient and her son using Polish language line  Shae ID number [id number] Advised patient to start taking 1 heaping this point of MiraLAX once or twice a day as well as continue taking Colace 100 mg p.o. twice daily Advised her to discontinue use of oxycodone and take ibuprofen and Tylenol on a scheduled.  Follow-up in 2 weeks

## 2024-06-27 ENCOUNTER — APPOINTMENT (OUTPATIENT)
Dept: UROGYNECOLOGY | Facility: CLINIC | Age: 79
End: 2024-06-27
Payer: MEDICARE

## 2024-06-27 PROCEDURE — 99024 POSTOP FOLLOW-UP VISIT: CPT

## 2024-06-27 PROCEDURE — 81003 URINALYSIS AUTO W/O SCOPE: CPT | Mod: QW

## 2024-06-28 LAB
APPEARANCE: CLEAR
BACTERIA: NEGATIVE /HPF
BILIRUBIN URINE: NEGATIVE
BLOOD URINE: ABNORMAL
CAST: 1 /LPF
COLOR: YELLOW
EPITHELIAL CELLS: 0 /HPF
GLUCOSE QUALITATIVE U: NEGATIVE MG/DL
KETONES URINE: NEGATIVE MG/DL
LEUKOCYTE ESTERASE URINE: ABNORMAL
MICROSCOPIC-UA: NORMAL
NITRITE URINE: NEGATIVE
PH URINE: 7
PROTEIN URINE: NEGATIVE MG/DL
RED BLOOD CELLS URINE: 13 /HPF
SPECIFIC GRAVITY URINE: 1.01
UROBILINOGEN URINE: 0.2 MG/DL
WHITE BLOOD CELLS URINE: 47 /HPF

## 2024-07-01 DIAGNOSIS — N39.0 URINARY TRACT INFECTION, SITE NOT SPECIFIED: ICD-10-CM

## 2024-07-01 RX ORDER — CEFPODOXIME PROXETIL 100 MG/1
100 TABLET, FILM COATED ORAL
Qty: 14 | Refills: 0 | Status: ACTIVE | COMMUNITY
Start: 2024-07-01 | End: 1900-01-01

## 2024-07-02 ENCOUNTER — APPOINTMENT (OUTPATIENT)
Dept: OPHTHALMOLOGY | Facility: CLINIC | Age: 79
End: 2024-07-02
Payer: MEDICARE

## 2024-07-02 ENCOUNTER — NON-APPOINTMENT (OUTPATIENT)
Age: 79
End: 2024-07-02

## 2024-07-02 PROCEDURE — 92134 CPTRZ OPH DX IMG PST SGM RTA: CPT

## 2024-07-02 PROCEDURE — 99213 OFFICE O/P EST LOW 20 MIN: CPT

## 2024-07-12 LAB
APPEARANCE: CLEAR
BACTERIA: NEGATIVE /HPF
BILIRUBIN URINE: NEGATIVE
BLOOD URINE: NEGATIVE
CAST: 0 /LPF
COLOR: YELLOW
EPITHELIAL CELLS: 1 /HPF
GLUCOSE QUALITATIVE U: NEGATIVE MG/DL
KETONES URINE: NEGATIVE MG/DL
LEUKOCYTE ESTERASE URINE: ABNORMAL
MICROSCOPIC-UA: NORMAL
NITRITE URINE: NEGATIVE
PH URINE: 5.5
PROTEIN URINE: NEGATIVE MG/DL
RED BLOOD CELLS URINE: 1 /HPF
SPECIFIC GRAVITY URINE: 1.02
UROBILINOGEN URINE: 0.2 MG/DL
WHITE BLOOD CELLS URINE: 4 /HPF

## 2024-07-15 RX ORDER — CEPHALEXIN 500 MG/1
500 CAPSULE ORAL
Qty: 14 | Refills: 0 | Status: ACTIVE | COMMUNITY
Start: 2024-07-15 | End: 1900-01-01

## 2024-07-26 LAB
APPEARANCE: CLEAR
BACTERIA UR CULT: NORMAL
BACTERIA: NEGATIVE /HPF
BILIRUBIN URINE: NEGATIVE
BLOOD URINE: NEGATIVE
CALCIUM OXALATE CRYSTALS: PRESENT
CAST: 0 /LPF
COLOR: YELLOW
EPITHELIAL CELLS: 1 /HPF
GLUCOSE QUALITATIVE U: NEGATIVE MG/DL
KETONES URINE: NEGATIVE MG/DL
LEUKOCYTE ESTERASE URINE: ABNORMAL
MICROSCOPIC-UA: NORMAL
NITRITE URINE: NEGATIVE
PH URINE: 5.5
PROTEIN URINE: NEGATIVE MG/DL
RED BLOOD CELLS URINE: 2 /HPF
REVIEW: NORMAL
SPECIFIC GRAVITY URINE: 1.02
UROBILINOGEN URINE: 0.2 MG/DL
WHITE BLOOD CELLS URINE: 4 /HPF

## 2024-08-02 ENCOUNTER — APPOINTMENT (OUTPATIENT)
Dept: UROGYNECOLOGY | Facility: CLINIC | Age: 79
End: 2024-08-02

## 2024-08-02 DIAGNOSIS — R35.0 FREQUENCY OF MICTURITION: ICD-10-CM

## 2024-08-02 PROCEDURE — 99024 POSTOP FOLLOW-UP VISIT: CPT

## 2024-08-02 PROCEDURE — 51701 INSERT BLADDER CATHETER: CPT | Mod: 58

## 2024-08-02 PROCEDURE — 81003 URINALYSIS AUTO W/O SCOPE: CPT | Mod: QW

## 2024-08-02 NOTE — PHYSICAL EXAM
[FreeTextEntry2] : Lolly Lorenzo [FreeTextEntry1] : General: Not in acute distress, alert and oriented x3. Abdomen: Soft, nontender, and nondistended. No obvious hepatosplenomegaly.  Pelvic Exam: Normal external female genitalia.  Cough stress test is negative. Post void residual was checked with In-N-Out cath and was 40 cc.  Atrophic-appearing vaginal epithelium. Anterior vaginal wall incisions are healed well. Sutures resolved.  No abnormal discharge. All vaginal compartments are well supported.

## 2024-08-02 NOTE — DISCUSSION/SUMMARY
[FreeTextEntry1] : All postoperative restrictions lifted. She would like to go to [place] to visit her daughters and grandkids.  Advised ambulation etc. Cath urine specimen was sent for UA and culture.  If there is evidence of urinary tract infection, will recommend antibiotic such as Keflex 500 mg p.o. twice daily for 7 days Follow-up in 4 to 6 months

## 2024-08-02 NOTE — PROCEDURE
[Straight Catheterization] : insertion of a straight catheter [Urinary Frequency] : urinary frequency [Patient] : the patient [None] : there were no complications with the catheter insertion [Clear] : clear [Culture] : culture [Urinalysis] : urinalysis [No Complications] : no complications [Tolerated Well] : the patient tolerated the procedure well [Post procedure instructions and information given] : Post procedure instructions and information were given and reviewed with patient. [0] : 0

## 2024-08-02 NOTE — ASSESSMENT
[FreeTextEntry1] : Patient has made excellent postoperative recovery.  There is no evidence of incomplete bladder emptying

## 2024-08-02 NOTE — HISTORY OF PRESENT ILLNESS
[FreeTextEntry1] : Patient is a 78-year-old who underwent sacrospinous hysteropexy, anterior repair, and mid urethral sling placement on 6/14/2024. She had an inconclusive voiding trial in the hospital and was discharged home with the Mason catheter on postop day #1. Pt's Mason catheter was removed in the office on 06/17/24 with successful TOV.  Patient presents today for scheduled post operative visit.  She reports doing very well.  Has no concerns.

## 2024-08-05 LAB
APPEARANCE: CLEAR
BACTERIA UR CULT: NORMAL
BACTERIA: NEGATIVE /HPF
BILIRUBIN URINE: NEGATIVE
BLOOD URINE: NEGATIVE
CAST: 0 /LPF
COLOR: YELLOW
EPITHELIAL CELLS: 0 /HPF
GLUCOSE QUALITATIVE U: NEGATIVE MG/DL
KETONES URINE: NEGATIVE MG/DL
LEUKOCYTE ESTERASE URINE: NEGATIVE
MICROSCOPIC-UA: NORMAL
NITRITE URINE: NEGATIVE
PH URINE: 5.5
PROTEIN URINE: NEGATIVE MG/DL
RED BLOOD CELLS URINE: 0 /HPF
SPECIFIC GRAVITY URINE: 1.01
UROBILINOGEN URINE: 0.2 MG/DL
WHITE BLOOD CELLS URINE: 0 /HPF

## 2024-08-13 ENCOUNTER — NON-APPOINTMENT (OUTPATIENT)
Age: 79
End: 2024-08-13

## 2024-08-13 ENCOUNTER — APPOINTMENT (OUTPATIENT)
Dept: OPHTHALMOLOGY | Facility: CLINIC | Age: 79
End: 2024-08-13
Payer: MEDICARE

## 2024-08-13 PROCEDURE — 99213 OFFICE O/P EST LOW 20 MIN: CPT

## 2024-08-13 PROCEDURE — 92020 GONIOSCOPY: CPT

## 2024-08-16 ENCOUNTER — APPOINTMENT (OUTPATIENT)
Dept: ULTRASOUND IMAGING | Facility: CLINIC | Age: 79
End: 2024-08-16

## 2024-08-16 PROCEDURE — 76536 US EXAM OF HEAD AND NECK: CPT

## 2024-12-10 ENCOUNTER — APPOINTMENT (OUTPATIENT)
Dept: OPHTHALMOLOGY | Facility: CLINIC | Age: 79
End: 2024-12-10

## 2024-12-17 ENCOUNTER — APPOINTMENT (OUTPATIENT)
Dept: OPHTHALMOLOGY | Facility: CLINIC | Age: 79
End: 2024-12-17

## 2025-02-06 ENCOUNTER — APPOINTMENT (OUTPATIENT)
Dept: UROGYNECOLOGY | Facility: CLINIC | Age: 80
End: 2025-02-06